# Patient Record
Sex: MALE | Race: WHITE | NOT HISPANIC OR LATINO | Employment: OTHER | URBAN - NONMETROPOLITAN AREA
[De-identification: names, ages, dates, MRNs, and addresses within clinical notes are randomized per-mention and may not be internally consistent; named-entity substitution may affect disease eponyms.]

---

## 2024-10-09 ENCOUNTER — OFFICE VISIT (OUTPATIENT)
Dept: CARDIOLOGY | Facility: CLINIC | Age: 64
End: 2024-10-09
Payer: MEDICARE

## 2024-10-09 VITALS
SYSTOLIC BLOOD PRESSURE: 139 MMHG | HEART RATE: 62 BPM | WEIGHT: 166.2 LBS | DIASTOLIC BLOOD PRESSURE: 80 MMHG | OXYGEN SATURATION: 98 % | BODY MASS INDEX: 23.27 KG/M2 | HEIGHT: 71 IN

## 2024-10-09 DIAGNOSIS — I25.10 CORONARY ARTERY DISEASE INVOLVING NATIVE CORONARY ARTERY OF NATIVE HEART WITHOUT ANGINA PECTORIS: Primary | ICD-10-CM

## 2024-10-09 DIAGNOSIS — I10 PRIMARY HYPERTENSION: ICD-10-CM

## 2024-10-09 PROCEDURE — 99203 OFFICE O/P NEW LOW 30 MIN: CPT | Performed by: NURSE PRACTITIONER

## 2024-10-09 PROCEDURE — 1160F RVW MEDS BY RX/DR IN RCRD: CPT | Performed by: NURSE PRACTITIONER

## 2024-10-09 PROCEDURE — 1159F MED LIST DOCD IN RCRD: CPT | Performed by: NURSE PRACTITIONER

## 2024-10-09 PROCEDURE — 93000 ELECTROCARDIOGRAM COMPLETE: CPT | Performed by: NURSE PRACTITIONER

## 2024-10-09 RX ORDER — ATORVASTATIN CALCIUM 40 MG/1
40 TABLET, FILM COATED ORAL DAILY
COMMUNITY

## 2024-10-09 RX ORDER — NITROGLYCERIN 0.4 MG/1
0.4 TABLET SUBLINGUAL
Qty: 30 TABLET | Refills: 3 | Status: SHIPPED | OUTPATIENT
Start: 2024-10-09

## 2024-10-09 RX ORDER — AMLODIPINE BESYLATE 10 MG/1
10 TABLET ORAL DAILY
COMMUNITY

## 2024-10-09 RX ORDER — ASPIRIN 81 MG/1
81 TABLET ORAL DAILY
COMMUNITY

## 2024-10-09 RX ORDER — METOPROLOL SUCCINATE 25 MG/1
25 TABLET, EXTENDED RELEASE ORAL DAILY
COMMUNITY

## 2024-10-09 RX ORDER — NITROGLYCERIN 0.4 MG/1
0.4 TABLET SUBLINGUAL
COMMUNITY
End: 2024-10-09 | Stop reason: SDUPTHER

## 2024-10-09 RX ORDER — PANTOPRAZOLE SODIUM 40 MG/1
40 TABLET, DELAYED RELEASE ORAL 2 TIMES DAILY
COMMUNITY

## 2024-10-09 RX ORDER — CETIRIZINE HYDROCHLORIDE 10 MG/1
10 TABLET ORAL DAILY
COMMUNITY

## 2024-10-09 RX ORDER — ISOSORBIDE MONONITRATE 30 MG/1
30 TABLET, EXTENDED RELEASE ORAL DAILY
COMMUNITY

## 2024-10-09 NOTE — PROGRESS NOTES
Subjective     Carlos Kline is a 64 y.o. male who presents to day for No chief complaint on file..    CHIEF COMPLIANT  No chief complaint on file.      Active Problems:  Problem List Items Addressed This Visit    None  Visit Diagnoses       Coronary artery disease involving native coronary artery of native heart without angina pectoris    -  Primary    Relevant Medications    nitroglycerin (NITROSTAT) 0.4 MG SL tablet    metoprolol succinate XL (TOPROL-XL) 25 MG 24 hr tablet    amLODIPine (NORVASC) 10 MG tablet    isosorbide mononitrate (IMDUR) 30 MG 24 hr tablet    Primary hypertension        Relevant Medications    metoprolol succinate XL (TOPROL-XL) 25 MG 24 hr tablet    amLODIPine (NORVASC) 10 MG tablet        Problem list  1.  Coronary artery disease with 5 previous stent  1.1 by heart cath 5/21: 50% LAD 50% stenosis of the proximal OM 2, no hemodynamically significant circumflex disease, RCA 90% local stenosis followed by a 90% mid with 3 stents placed, EF 55 to 60%  1.2 Heart cath 2/22: 60% mid LAD with RFR of 0.85 with 2 stent placement, no significant circumflex disease, patent stents in the RCA,  1.3 stress test 11/22: Negative for ischemia, preserved EF  2.  Hypertension  3.SOB    HPI  HPI  Mr. Carlos Kline is a 64-year-old male patient being seen today to establish care for cardiac evaluation.    Patient does have a history of coronary artery disease in which she received 3 stents to the RCA in 2021 and and 2022 he received 2 more stents to the left anterior descending.  He is on aspirin for antiplatelet therapy Lipitor for high intensity statin therapy.  Isosorbide and amlodipine for antianginal therapy.  Patient reports that he has been doing extremely well since the stent placement to the left anterior descending.    Patient does have shortness of breath that occurs with exertion.  He says this mainly happens when he chases his grandkids around for a while.  Or he says at times that it will  happen if he is shopping and has to walk a long distance.  He says his shortness of breath is usually with increased levels of exertion.    Patient also has a history of chronic arterial hypertension which she is on amlodipine and metoprolol.  Today's blood pressure is 139/80 heart rate of 67.  He did provide a log that showed that his systolic blood pressures [place] between 110 and 120.    Overall he feels like he is doing well from a cardiovascular standpoint.  He denies any angina anginal equivalent symptoms.  Patient denies any chest pain, palpitations,  syncope, PND, orthopnea, or strokelike symptoms.  PRIOR MEDS  Current Outpatient Medications on File Prior to Visit   Medication Sig Dispense Refill    amLODIPine (NORVASC) 10 MG tablet Take 1 tablet by mouth Daily.      aspirin 81 MG EC tablet Take 1 tablet by mouth Daily.      atorvastatin (LIPITOR) 40 MG tablet Take 1 tablet by mouth Daily.      cetirizine (zyrTEC) 10 MG tablet Take 1 tablet by mouth Daily.      empagliflozin (Jardiance) 25 MG tablet tablet Take 1 tablet by mouth Daily.      isosorbide mononitrate (IMDUR) 30 MG 24 hr tablet Take 1 tablet by mouth Daily.      linagliptin (TRADJENTA) 5 MG tablet tablet Take 1 tablet by mouth Daily.      metFORMIN (GLUCOPHAGE) 500 MG tablet Take 1 tablet by mouth 2 (Two) Times a Day With Meals.      metoprolol succinate XL (TOPROL-XL) 25 MG 24 hr tablet Take 1 tablet by mouth Daily.      nitroglycerin (NITROSTAT) 0.4 MG SL tablet Place 1 tablet under the tongue Every 5 (Five) Minutes As Needed for Chest Pain. Take no more than 3 doses in 15 minutes.      pantoprazole (PROTONIX) 40 MG EC tablet Take 1 tablet by mouth 2 (Two) Times a Day.       No current facility-administered medications on file prior to visit.       ALLERGIES  Bee venom    HISTORY  Past Medical History:   Diagnosis Date    DM (diabetes mellitus)     type 2    Hypertension        Social History     Socioeconomic History    Marital status:   "  Tobacco Use    Smoking status: Former     Current packs/day: 0.00     Types: Cigarettes     Quit date: 10/9/2009     Years since quitting: 15.0    Smokeless tobacco: Never   Vaping Use    Vaping status: Never Used   Substance and Sexual Activity    Alcohol use: Not Currently     Comment: quit in 1999    Drug use: Not Currently     Types: Marijuana    Sexual activity: Defer       Family History   Problem Relation Age of Onset    Heart disease Mother     Breast cancer Mother     Heart attack Father        Review of Systems   Constitutional:  Negative for chills, fatigue and fever.   HENT:  Positive for congestion. Negative for rhinorrhea and sore throat.    Eyes:  Negative for visual disturbance (wears glasses).   Respiratory:  Positive for apnea (does not tolerate CPAP) and shortness of breath (with heavy exertion). Negative for chest tightness.    Cardiovascular:  Positive for leg swelling (feet every now and then is mild). Negative for chest pain and palpitations.   Gastrointestinal:  Negative for constipation, diarrhea and nausea.   Musculoskeletal:  Negative for arthralgias, back pain and neck pain.   Skin:  Negative for rash and wound.   Allergic/Immunologic: Positive for environmental allergies. Negative for food allergies.   Neurological:  Positive for dizziness (random getting up and down not often) and light-headedness. Negative for syncope and weakness.   Hematological:  Bruises/bleeds easily.   Psychiatric/Behavioral:  Positive for sleep disturbance (sleeps a few hours then gets up to use restroom).        Objective     VITALS: /80 (BP Location: Right arm, Patient Position: Sitting, Cuff Size: Adult)   Pulse 62   Ht 180.3 cm (71\")   Wt 75.4 kg (166 lb 3.2 oz)   SpO2 98%   BMI 23.18 kg/m²     LABS:   Lab Results (most recent)       None            IMAGING:   No Images in the past 120 days found..    EXAM:  Physical Exam  Vitals and nursing note reviewed.   Constitutional:       Appearance: He " is well-developed.   HENT:      Head: Normocephalic.   Neck:      Thyroid: No thyroid mass.      Vascular: No carotid bruit or JVD.      Trachea: Trachea and phonation normal.   Cardiovascular:      Rate and Rhythm: Normal rate and regular rhythm.      Pulses:           Radial pulses are 2+ on the right side and 2+ on the left side.        Posterior tibial pulses are 2+ on the right side and 2+ on the left side.      Heart sounds: No murmur heard.     No friction rub. Gallop present.   Pulmonary:      Effort: Pulmonary effort is normal. No respiratory distress.      Breath sounds: Normal breath sounds. No wheezing or rales.   Musculoskeletal:         General: No swelling. Normal range of motion.      Cervical back: Neck supple.   Skin:     General: Skin is warm and dry.      Capillary Refill: Capillary refill takes less than 2 seconds.      Findings: No rash.   Neurological:      Mental Status: He is alert and oriented to person, place, and time.   Psychiatric:         Speech: Speech normal.         Behavior: Behavior normal.         Thought Content: Thought content normal.         Judgment: Judgment normal.         Procedure     ECG 12 Lead    Date/Time: 10/9/2024 1:15 PM  Performed by: James Alejo APRN    Authorized by: James Aeljo APRN  Previous ECG: no previous ECG available  Rhythm: sinus bradycardia  Rate: bradycardic  BPM: 59  Conduction: right bundle branch block  QRS axis: left  Other findings: non-specific ST-T wave changes  Comments: Qtc 435 ms  No acute changes             Assessment & Plan    Diagnosis Plan   1. Coronary artery disease involving native coronary artery of native heart without angina pectoris        2. Primary hypertension        1.  Patient does have a history of coronary artery disease with 5 previous stents.  He has done remarkably well and on appropriate guideline driven medication therapy.  We will continue his current medications without change.    2.  Patient's blood  pressure is controlled on current blood pressure medication regimen.  No medication changes are warranted at this time.  Patient advised to monitor blood pressure on a daily basis and report any persistent highs or lows.  Set goal blood pressure for patient at 130/80 or below.    3.  Informed of signs and symptoms of ACS and advised to seek emergent treatment for any new worsening symptoms.  Patient also advised sooner follow-up as needed.  Also advised to follow-up with family doctor as needed  This note is dictated utilizing voice recognition software.  Although this record has been proof read, transcriptional errors may still be present. If questions occur regarding the content of this record please do not hesitate to call our office.  I have reviewed and confirmed the accuracy of the ROS as documented by the MA/LPN/RN ARLET Mayfield    No follow-ups on file.    Diagnoses and all orders for this visit:    1. Coronary artery disease involving native coronary artery of native heart without angina pectoris (Primary)    2. Primary hypertension    Other orders  -     ECG 12 Lead        Carlos Kline  reports that he quit smoking about 15 years ago. His smoking use included cigarettes. He has never used smokeless tobacco. I have educated him on the risk of diseases from using tobacco products. Patient does not smoke at this time.      BMI is within normal parameters. No other follow-up for BMI required.    Advance Care Planning   ACP discussion was held with the patient during this visit. Patient does not have an advance directive, information provided.             MEDS ORDERED DURING VISIT:  No orders of the defined types were placed in this encounter.          This document has been electronically signed by ARLET Mayfield Jr.  October 9, 2024 13:42 EDT

## 2025-04-14 ENCOUNTER — TELEPHONE (OUTPATIENT)
Dept: CARDIOLOGY | Facility: CLINIC | Age: 65
End: 2025-04-14
Payer: MEDICARE

## 2025-04-14 NOTE — TELEPHONE ENCOUNTER
Called pt to alessandro cancelled appt and pt stated that they did not want to alessandro due to their insurance not covering the visit any more